# Patient Record
Sex: MALE | Race: WHITE | Employment: FULL TIME | URBAN - METROPOLITAN AREA
[De-identification: names, ages, dates, MRNs, and addresses within clinical notes are randomized per-mention and may not be internally consistent; named-entity substitution may affect disease eponyms.]

---

## 2020-07-17 ENCOUNTER — OFFICE VISIT (OUTPATIENT)
Dept: INTERNAL MEDICINE CLINIC | Age: 30
End: 2020-07-17

## 2020-07-17 ENCOUNTER — TELEPHONE (OUTPATIENT)
Dept: INTERNAL MEDICINE CLINIC | Age: 30
End: 2020-07-17

## 2020-07-17 VITALS
WEIGHT: 198 LBS | RESPIRATION RATE: 16 BRPM | OXYGEN SATURATION: 97 % | HEIGHT: 73 IN | BODY MASS INDEX: 26.24 KG/M2 | DIASTOLIC BLOOD PRESSURE: 86 MMHG | SYSTOLIC BLOOD PRESSURE: 156 MMHG | HEART RATE: 65 BPM | TEMPERATURE: 97.8 F

## 2020-07-17 DIAGNOSIS — M25.469 KNEE SWELLING: ICD-10-CM

## 2020-07-17 DIAGNOSIS — M25.561 ACUTE PAIN OF RIGHT KNEE: Primary | ICD-10-CM

## 2020-07-17 DIAGNOSIS — R22.41 KNEE MASS, RIGHT: ICD-10-CM

## 2020-07-17 NOTE — PROGRESS NOTES
Verified name and birth date for privacy precautions. Chart reviewed in preparation for today's visit.      Chief Complaint   Patient presents with    Mass     knot behind right knee x 1 week           Health Maintenance Due   Topic    DTaP/Tdap/Td series (3 - Tdap)         Wt Readings from Last 3 Encounters:   07/17/20 198 lb (89.8 kg)   05/02/16 203 lb 12.8 oz (92.4 kg)   03/28/14 228 lb 3.2 oz (103.5 kg)     Temp Readings from Last 3 Encounters:   07/17/20 97.8 °F (36.6 °C) (Temporal)   05/02/16 98.9 °F (37.2 °C)   01/07/13 98.3 °F (36.8 °C) (Oral)     BP Readings from Last 3 Encounters:   07/17/20 156/86   05/02/16 122/80   03/28/14 130/78     Pulse Readings from Last 3 Encounters:   07/17/20 65   05/02/16 72   03/28/14 66

## 2020-07-17 NOTE — PROGRESS NOTES
HISTORY OF PRESENT ILLNESS  Barbra Gutiérrez is a 31 y.o. male. Patient reports mass behind right knee starting 1 week ago. He went on a long run and when he got home he had redness, warmth, and swelling of posterior right knee. It was still swollen the next day and since then he has noticed a soft mass behind his right knee. Ice has helped with pain and swelling, but lump is still present. No history of clots. Visit Vitals  /86 (BP 1 Location: Left arm, BP Patient Position: Sitting)   Pulse 65   Temp 97.8 °F (36.6 °C) (Temporal)   Resp 16   Ht 6' 1\" (1.854 m)   Wt 198 lb (89.8 kg)   SpO2 97%   BMI 26.12 kg/m²       HPI    Review of Systems   Musculoskeletal:        Pain and mass behind right knee       Physical Exam  Constitutional:       Appearance: Normal appearance. Musculoskeletal:      Right knee: He exhibits swelling (posterior right knee with soft nodule of popliteal region; pain with palpation, no erythema or warmth noted). Neurological:      General: No focal deficit present. Mental Status: He is alert and oriented to person, place, and time. Psychiatric:         Mood and Affect: Mood normal.         Behavior: Behavior normal.     nodule is approximately 1 in; patient able to perform full range of motion of knee    ASSESSMENT and PLAN    ICD-10-CM ICD-9-CM    1. Acute pain of right knee  M25.561 719.46 DUPLEX LOWER EXT VENOUS RIGHT      US EXT NONVAS RT LTD   2. Knee mass, right  R22.41 719.66 DUPLEX LOWER EXT VENOUS RIGHT      US EXT NONVAS RT LTD   3.  Knee swelling  M25.469 719.06 DUPLEX LOWER EXT VENOUS RIGHT     Orders Placed This Encounter    US EXT NONVAS RT LTD   most likely baker's cyst, rule out dvt  Ace wrap for support  Ice

## 2020-07-18 ENCOUNTER — HOSPITAL ENCOUNTER (OUTPATIENT)
Dept: VASCULAR SURGERY | Age: 30
Discharge: HOME | End: 2020-07-18
Attending: NURSE PRACTITIONER
Payer: COMMERCIAL

## 2020-07-18 DIAGNOSIS — M25.561 ACUTE PAIN OF RIGHT KNEE: ICD-10-CM

## 2020-07-18 DIAGNOSIS — M25.469 KNEE SWELLING: ICD-10-CM

## 2020-07-18 DIAGNOSIS — R22.41 KNEE MASS, RIGHT: ICD-10-CM

## 2020-07-18 PROCEDURE — 93971 EXTREMITY STUDY: CPT
